# Patient Record
Sex: MALE | Race: BLACK OR AFRICAN AMERICAN | URBAN - METROPOLITAN AREA
[De-identification: names, ages, dates, MRNs, and addresses within clinical notes are randomized per-mention and may not be internally consistent; named-entity substitution may affect disease eponyms.]

---

## 2019-12-19 ENCOUNTER — OFFICE VISIT (OUTPATIENT)
Dept: INTERNAL MEDICINE CLINIC | Age: 39
End: 2019-12-19

## 2019-12-19 VITALS
OXYGEN SATURATION: 96 % | WEIGHT: 284.7 LBS | HEIGHT: 69 IN | TEMPERATURE: 98.5 F | HEART RATE: 84 BPM | RESPIRATION RATE: 20 BRPM | DIASTOLIC BLOOD PRESSURE: 81 MMHG | BODY MASS INDEX: 42.17 KG/M2 | SYSTOLIC BLOOD PRESSURE: 137 MMHG

## 2019-12-19 DIAGNOSIS — Z00.00 PREVENTATIVE HEALTH CARE: ICD-10-CM

## 2019-12-19 DIAGNOSIS — J04.0 LARYNGITIS: ICD-10-CM

## 2019-12-19 DIAGNOSIS — J20.9 ACUTE BRONCHITIS, UNSPECIFIED ORGANISM: Primary | ICD-10-CM

## 2019-12-19 DIAGNOSIS — R03.0 BORDERLINE HYPERTENSION: ICD-10-CM

## 2019-12-19 DIAGNOSIS — E66.01 MORBID OBESITY (HCC): ICD-10-CM

## 2019-12-19 RX ORDER — ALBUTEROL SULFATE 90 UG/1
2 AEROSOL, METERED RESPIRATORY (INHALATION)
Qty: 1 INHALER | Refills: 11 | Status: SHIPPED | OUTPATIENT
Start: 2019-12-19

## 2019-12-19 RX ORDER — AZITHROMYCIN 250 MG/1
250 TABLET, FILM COATED ORAL SEE ADMIN INSTRUCTIONS
Qty: 6 TAB | Refills: 1 | Status: SHIPPED | OUTPATIENT
Start: 2019-12-19 | End: 2019-12-24

## 2019-12-19 NOTE — PROGRESS NOTES
1. Have you been to the ER, urgent care clinic since your last visit? Hospitalized since your last visit? No    2. Have you seen or consulted any other health care providers outside of the [address] [place] [address] since your last visit? Include any pap smears or colon screening.  No     Wants to establish care and discuss cold symptoms

## 2019-12-19 NOTE — PROGRESS NOTES
SPORTS MEDICINE AND PRIMARY CARE  Karen Pickard MD, [address] [address]  Phone:  [phone]  Fax: [phone]    Chief Complaint   Patient presents with    Establish Care    Cold Symptoms       SUBJECTIVE:    Erika Gonzalez is a 37 y.o. male Patient comes in today as a new patient, seen for evaluation and ongoing care. Review of [facility] indicates she has borderline hypertension and morbid obesity and she is seen for evaluation. Patient comes in today not feeling well. He first thought he had the flu, then URI since Thursday and he developed a cough productive of mucopurulent sputum. Because of excessive coughing he felt he got laryngitis. It is just a bad cold. Patient is seen for evaluation. He has had some chills.               Past Medical History:   Diagnosis Date    Acute bronchitis 12/19/2019    Borderline hypertension     Laryngitis 12/19/2019    Morbid obesity ([facility] [address]      Past Surgical History:   Procedure Laterality Date    HX VASECTOMY  5/17/2007     No Known Allergies    REVIEW OF SYSTEMS:  General: negative for - chills or fever  ENT: negative for - headaches, nasal congestion or tinnitus  Respiratory: negative for - cough, hemoptysis, shortness of breath or wheezing  Cardiovascular : negative for - chest pain, edema, palpitations or shortness of breath  Gastrointestinal: negative for - abdominal pain, blood in stools, heartburn or nausea/vomiting  Genito-Urinary: no dysuria, trouble voiding, or hematuria  Musculoskeletal: negative for - gait disturbance, joint pain, joint stiffness or joint swelling  Neurological: no TIA or stroke symptoms  Hematologic: no bruises, no bleeding, no swollen glands  Integument: no lumps, mole changes, nail changes or rash  Endocrine:no malaise/lethargy or unexpected weight changes      Social History     Socioeconomic History    Marital status:      Spouse name: Not on file    Number of children: Not on file    Years of education: Not on file    Highest education level: Not on file   Tobacco Use    Smoking status: Never Smoker    Smokeless tobacco: Never Used   Substance and Sexual Activity    Alcohol use: Yes     Alcohol/week: 0.8 standard drinks     Types: 1 Glasses of wine per week     Comment: occasional    Drug use: No    Sexual activity: Yes     Partners: Female     Birth control/protection: Surgical     Family History   Problem Relation Age of Onset    Breast Problems Mother     Cancer Mother 59        breast   Habits:  Lifetime non smoker, non drug abuser, occasional alcohol use. Social History:  The patient is  for the past seven years. They have two children, a 10 YO son and an 7 YO daughter. 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 Roman Catholic preference is Hinduism.    Family History:  Father is alive and well at 70. Mother  in her 62s of breast cancer. A sister on his father's side and sister on his mother's side. OBJECTIVE:     Visit Vitals  /81   Pulse 84   Temp 98.5 °F (36.9 °C) (Oral)   Resp 20   Ht 5' 9\" (1.753 m)   Wt 284 lb 11.2 oz (129.1 kg)   SpO2 96%   BMI 42.04 kg/m²     CONSTITUTIONAL: well , well nourished, appears age appropriate  EYES: perrla, eom intact  ENMT:moist mucous membranes, pharynx clear  NECK: supple. Thyroid normal  RESPIRATORY: Chest: clear bilaterally  CARDIOVASCULAR: Heart: regular rate and rhythm  GASTROINTESTINAL: Abdomen: soft, bowel sounds active  HEMATOLOGIC: no pathological lymph nodes palpated  MUSCULOSKELETAL: Extremities: no edema, pulse 1+   INTEGUMENT: No unusual rashes or suspicious skin lesions noted. Nails appear normal.  NEUROLOGIC: non-focal exam   MENTAL STATUS: alert and oriented, appropriate affect     No visits with results within 3 Month(s) from this visit.    Latest known visit with results is:   Office Visit on 2014   Component Date Value Ref Range Status    WBC 03/18/2014 4.4  3.4 - 10.8 x10E3/uL Final    RBC 03/18/2014 4.94  4.14 - 5.80 x10E6/uL Final    HGB 03/18/2014 14.0  12.6 - 17.7 g/dL Final    HCT 03/18/2014 44.2  37.5 - 51.0 % Final    MCV 03/18/2014 90  79 - 97 fL Final    MCH 03/18/2014 28.3  26.6 - 33.0 pg Final    MCHC 03/18/2014 31.7  31.5 - 35.7 g/dL Final    RDW 03/18/2014 13.9  12.3 - 15.4 % Final    PLATELET 14/67/3247 611  155 - 379 x10E3/uL Final    Glucose 03/18/2014 83  65 - 99 mg/dL Final    BUN 03/18/2014 15  6 - 20 mg/dL Final    Creatinine 03/18/2014 1.18  0.76 - 1.27 mg/dL Final    GFR est non-AA 03/18/2014 79  >59 mL/min/1.73 Final    GFR est AA 03/18/2014 92  >59 mL/min/1.73 Final    BUN/Creatinine ratio 03/18/2014 13  8 - 19 Final    Sodium 03/18/2014 140  134 - 144 mmol/L Final    Potassium 03/18/2014 3.8  3.5 - 5.2 mmol/L Final    Chloride 03/18/2014 101  97 - 108 mmol/L Final    CO2 03/18/2014 26  19 - 28 mmol/L Final    Calcium 03/18/2014 9.2  8.7 - 10.2 mg/dL Final    Protein, total 03/18/2014 6.8  6.0 - 8.5 g/dL Final    Albumin 03/18/2014 4.4  3.5 - 5.5 g/dL Final    GLOBULIN, TOTAL 03/18/2014 2.4  1.5 - 4.5 g/dL Final    A-G Ratio 03/18/2014 1.8  1.1 - 2.5 Final    Bilirubin, total 03/18/2014 0.8  0.0 - 1.2 mg/dL Final    Alk. phosphatase 03/18/2014 38* 39 - 117 IU/L Final    AST (SGOT) 03/18/2014 24  0 - 40 IU/L Final    ALT (SGPT) 03/18/2014 25  0 - 44 IU/L Final    Cholesterol, total 03/18/2014 174  100 - 199 mg/dL Final    Triglyceride 03/18/2014 82  0 - 149 mg/dL Final    HDL Cholesterol 03/18/2014 57  >39 mg/dL Final    Comment: According to ATP-III Guidelines, HDL-C >59 mg/dL is considered a                           negative risk factor for CHD.     VLDL, calculated 03/18/2014 16  5 - 40 mg/dL Final    LDL, calculated 03/18/2014 101* 0 - 99 mg/dL Final    Comment: Performed At: [facility] [address] [place], [place]  [id number]                           Irasema Goldman MD                           [phone]       ASSESSMENT:   1. Acute bronchitis, unspecified organism    2. Morbid obesity ([address]    3. Borderline hypertension    4. Preventative health care    5. Laryngitis      Patient's symptoms and findings are compatible with acute bronchitis, it is actually asthmatic bronchitis. He has bronchospasm noted, particularly in the right lung. It is for that reason we will ask for a chest x-ray. BMI reflects morbid obesity. He tells me he is going to start working on January 2nd and plans to get down towards his ideal body weight. We encourage a heart healthy, weight reducing diet and physical activity 30 minutes five days a week. In the past he has been said to be borderline hypertensive, which is the case today. We would like to see his BP less than 130/80, preferably less than 120/80. Therefore, when he gets over these symptoms we will ask him to come by to the office just long enough to get a BP check. He will do that in the next 2-3 weeks. If the BP remains greater than 120/80 we will place him on antihypertensive until he decides to get his weight down. As he gets his weight down and does the exercise, I suspect we will be able to stop the antihypertensive. The laryngitis is related to URI and should subside spontaneously. This will also complete a preventive healthcare visit. Appropriate lab studies were requested, as well as PSA, which we talk about. We advise him to come see us any time should he have an issue and unable to get an appointment. We also advise him he can call us for issues that can be handled on the phone and if he gets real sick we use [facility].      Discussed the patient's BMI with him.   The BMI follow up plan is as follows:     dietary management education, guidance, and counseling  encourage exercise  monitor weight  prescribed dietary intake    An After Visit Summary was printed and given to the patient. I have discussed the diagnosis with the patient and the intended plan as seen in the  orders above. The patient understands and agees with the plan. The patient has   received an after visit summary and questions were answered concerning  future plans  Patient labs and/or xrays were reviewed  Past records were reviewed. PLAN:  .  Orders Placed This Encounter    XR CHEST PA LAT    URINALYSIS W/ RFLX MICROSCOPIC    CBC WITH AUTOMATED DIFF    METABOLIC PANEL, COMPREHENSIVE    LIPID PANEL    PROSTATE SPECIFIC AG    HEMOGLOBIN A1C WITH EAG    AMB POC EKG ROUTINE W/ 12 LEADS, INTER & REP    azithromycin (ZITHROMAX) 250 mg tablet    albuterol (PROVENTIL HFA, VENTOLIN HFA, PROAIR HFA) 90 mcg/actuation inhaler       Follow-up and Dispositions    · Return in about 1 year (around 12/19/2020). ATTENTION:   This medical record was transcribed using an electronic medical records system. Although proofread, it may and can contain electronic and spelling errors. Other human spelling and other errors may be present. Corrections may be executed at a later time. Please feel free to contact us for any clarifications as needed.

## 2019-12-20 LAB
ALBUMIN SERPL-MCNC: 4.2 G/DL (ref 3.5–5.5)
ALBUMIN/GLOB SERPL: 1.5 {RATIO} (ref 1.2–2.2)
ALP SERPL-CCNC: 46 IU/L (ref 39–117)
ALT SERPL-CCNC: 106 IU/L (ref 0–44)
APPEARANCE UR: CLEAR
AST SERPL-CCNC: 117 IU/L (ref 0–40)
BACTERIA #/AREA URNS HPF: NORMAL /[HPF]
BASOPHILS # BLD AUTO: 0 X10E3/UL (ref 0–0.2)
BASOPHILS NFR BLD AUTO: 1 %
BILIRUB SERPL-MCNC: 0.4 MG/DL (ref 0–1.2)
BILIRUB UR QL STRIP: NEGATIVE
BUN SERPL-MCNC: 11 MG/DL (ref 6–24)
BUN/CREAT SERPL: 9 (ref 9–20)
CALCIUM SERPL-MCNC: 8.8 MG/DL (ref 8.7–10.2)
CASTS URNS QL MICRO: NORMAL /LPF
CHLORIDE SERPL-SCNC: 98 MMOL/L (ref 96–106)
CHOLEST SERPL-MCNC: 189 MG/DL (ref 100–199)
CO2 SERPL-SCNC: 24 MMOL/L (ref 20–29)
COLOR UR: YELLOW
CREAT SERPL-MCNC: 1.22 MG/DL (ref 0.76–1.27)
EOSINOPHIL # BLD AUTO: 0.1 X10E3/UL (ref 0–0.4)
EOSINOPHIL NFR BLD AUTO: 1 %
EPI CELLS #/AREA URNS HPF: NORMAL /HPF (ref 0–10)
ERYTHROCYTE [DISTWIDTH] IN BLOOD BY AUTOMATED COUNT: 12.9 % (ref 12.3–15.4)
EST. AVERAGE GLUCOSE BLD GHB EST-MCNC: 137 MG/DL
GLOBULIN SER CALC-MCNC: 2.8 G/DL (ref 1.5–4.5)
GLUCOSE SERPL-MCNC: 89 MG/DL (ref 65–99)
GLUCOSE UR QL: NEGATIVE
HBA1C MFR BLD: 6.4 % (ref 4.8–5.6)
HCT VFR BLD AUTO: 40.1 % (ref 37.5–51)
HDLC SERPL-MCNC: 39 MG/DL
HGB BLD-MCNC: 13.6 G/DL (ref 13–17.7)
HGB UR QL STRIP: NEGATIVE
IMM GRANULOCYTES # BLD AUTO: 0 X10E3/UL (ref 0–0.1)
IMM GRANULOCYTES NFR BLD AUTO: 0 %
KETONES UR QL STRIP: NEGATIVE
LDLC SERPL CALC-MCNC: 130 MG/DL (ref 0–99)
LEUKOCYTE ESTERASE UR QL STRIP: NEGATIVE
LYMPHOCYTES # BLD AUTO: 2.4 X10E3/UL (ref 0.7–3.1)
LYMPHOCYTES NFR BLD AUTO: 41 %
MCH RBC QN AUTO: 30.4 PG (ref 26.6–33)
MCHC RBC AUTO-ENTMCNC: 33.9 G/DL (ref 31.5–35.7)
MCV RBC AUTO: 90 FL (ref 79–97)
MICRO URNS: ABNORMAL
MONOCYTES # BLD AUTO: 0.6 X10E3/UL (ref 0.1–0.9)
MONOCYTES NFR BLD AUTO: 10 %
MUCOUS THREADS URNS QL MICRO: PRESENT
NEUTROPHILS # BLD AUTO: 2.7 X10E3/UL (ref 1.4–7)
NEUTROPHILS NFR BLD AUTO: 47 %
NITRITE UR QL STRIP: NEGATIVE
PH UR STRIP: 5.5 [PH] (ref 5–7.5)
PLATELET # BLD AUTO: 272 X10E3/UL (ref 150–450)
POTASSIUM SERPL-SCNC: 4.5 MMOL/L (ref 3.5–5.2)
PROT SERPL-MCNC: 7 G/DL (ref 6–8.5)
PROT UR QL STRIP: ABNORMAL
PSA SERPL-MCNC: 18.9 NG/ML (ref 0–4)
RBC # BLD AUTO: 4.47 X10E6/UL (ref 4.14–5.8)
RBC #/AREA URNS HPF: NORMAL /HPF (ref 0–2)
SODIUM SERPL-SCNC: 137 MMOL/L (ref 134–144)
SP GR UR: 1.03 (ref 1–1.03)
TRIGL SERPL-MCNC: 102 MG/DL (ref 0–149)
UROBILINOGEN UR STRIP-MCNC: 0.2 MG/DL (ref 0.2–1)
VLDLC SERPL CALC-MCNC: 20 MG/DL (ref 5–40)
WBC # BLD AUTO: 5.9 X10E3/UL (ref 3.4–10.8)
WBC #/AREA URNS HPF: NORMAL /HPF (ref 0–5)

## 2019-12-21 NOTE — PROGRESS NOTES
Please call the patient and advise cipro 500 mg bid x 10 days - begin 10 days after completing the z pack and nurse visit for PSA 1 month after completing the cipro

## 2020-01-18 PROBLEM — Z00.00 PREVENTATIVE HEALTH CARE: Status: RESOLVED | Noted: 2019-12-19 | Resolved: 2020-01-18

## 2023-11-21 NOTE — PATIENT INSTRUCTIONS
Body Mass Index: Care Instructions Your Care Instructions Body mass index (BMI) can help you see if your weight is raising your risk for health problems. It uses a formula to compare how much you weigh with how tall you are. · A BMI lower than 18.5 is considered underweight. · A BMI between 18.5 and 24.9 is considered healthy. · A BMI between 25 and 29.9 is considered overweight. A BMI of 30 or higher is considered obese. If your BMI is in the normal range, it means that you have a lower risk for weight-related health problems. If your BMI is in the overweight or obese range, you may be at increased risk for weight-related health problems, such as high blood pressure, heart disease, stroke, arthritis or joint pain, and diabetes. If your BMI is in the underweight range, you may be at increased risk for health problems such as fatigue, lower protection (immunity) against illness, muscle loss, bone loss, hair loss, and hormone problems. BMI is just one measure of your risk for weight-related health problems. You may be at higher risk for health problems if you are not active, you eat an unhealthy diet, or you drink too much alcohol or use tobacco products. Follow-up care is a key part of your treatment and safety. Be sure to make and go to all appointments, and call your doctor if you are having problems. It's also a good idea to know your test results and keep a list of the medicines you take. How can you care for yourself at home? · Practice healthy eating habits. This includes eating plenty of fruits, vegetables, whole grains, lean protein, and low-fat dairy. · If your doctor recommends it, get more exercise. Walking is a good choice. Bit by bit, increase the amount you walk every day. Try for at least 30 minutes on most days of the week. · Do not smoke. Smoking can increase your risk for health problems.  If you need help quitting, talk to your doctor about stop-smoking programs and medicines. These can increase your chances of quitting for good. · Limit alcohol to 2 drinks a day for men and 1 drink a day for women. Too much alcohol can cause health problems. If you have a BMI higher than 25 · Your doctor may do other tests to check your risk for weight-related health problems. This may include measuring the distance around your waist. A waist measurement of more than 40 inches in men or 35 inches in women can increase the risk of weight-related health problems. · Talk with your doctor about steps you can take to stay healthy or improve your health. You may need to make lifestyle changes to lose weight and stay healthy, such as changing your diet and getting regular exercise. If you have a BMI lower than 18.5 · Your doctor may do other tests to check your risk for health problems. · Talk with your doctor about steps you can take to stay healthy or improve your health. You may need to make lifestyle changes to gain or maintain weight and stay healthy, such as getting more healthy foods in your diet and doing exercises to build muscle. Where can you learn more? Go to http://lv-elin.info/. Enter S176 in the search box to learn more about \"Body Mass Index: Care Instructions. \" Current as of: October 13, 2016 Content Version: 11.4 © 3632-9315 [employer]. Care instructions adapted under license by [employer] (which disclaims liability or warranty for this information). If you have questions about a medical condition or this instruction, always ask your healthcare professional. [address] any warranty or liability for your use of this information. F: PO tolerated  E: Replete with HD  GI: None  Diet: DASH/TLC/Renal  DVT: Heparin SubQ    Dispo: RMF.